# Patient Record
Sex: FEMALE | Race: OTHER | HISPANIC OR LATINO | URBAN - METROPOLITAN AREA
[De-identification: names, ages, dates, MRNs, and addresses within clinical notes are randomized per-mention and may not be internally consistent; named-entity substitution may affect disease eponyms.]

---
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
## 2023-01-20 NOTE — ASU PREOP CHECKLIST, PEDIATRIC - HEART RATE (BEATS/MIN)
[No Acute Distress] : in no acute distress [Well developed] : well developed [Well Nourished] : ~L well nourished [Good Hygeine] : demonstrates good hygeine [Oriented x3] : oriented to person, place, and time [Normal Memory] : ~T memory was ~L unimpaired [Normal Mood/Affect] : mood and affect are normal [No Edema] : ~T edema was not present [Normal Gait] : gait was normal [Normal] : was normal [Discharge] : a  ~M vaginal discharge was present [Moderate] : moderate [White] : white [Thin] : thin [No Bleeding] : there was no active vaginal bleeding [Tenderness] : ~T no ~M abdominal tenderness observed [Distended] : not distended [Foul Smelling] : not foul smelling [de-identified] : q-tip touch test 5/10 @ 1,11 and 8/10 @ 5,6,7. +erythema at posterior fourchette. No fissures, erosions, ulcerations. [de-identified] : Derm changes c/w vLP. +Sukhwinder's striae, numerous erythematous, patchy areas medial to Ramos's line and anterior vagina. [FreeTextEntry4] : Derm changes c/w vLP; PFMs 4/4 with MTrPs b/l levators, all groups and transverse perineii; +taut bands, +pain to palpatoin 95

## 2023-09-27 ENCOUNTER — APPOINTMENT (OUTPATIENT)
Dept: OTOLARYNGOLOGY | Facility: CLINIC | Age: 18
End: 2023-09-27
Payer: MEDICAID

## 2023-09-27 VITALS — BODY MASS INDEX: 16.13 KG/M2 | TEMPERATURE: 207.68 F | HEIGHT: 59 IN | WEIGHT: 80 LBS

## 2023-09-27 PROCEDURE — 69210 REMOVE IMPACTED EAR WAX UNI: CPT | Mod: LT

## 2023-09-27 PROCEDURE — 99214 OFFICE O/P EST MOD 30 MIN: CPT | Mod: 25

## 2023-09-27 RX ORDER — OFLOXACIN OTIC 3 MG/ML
0.3 SOLUTION AURICULAR (OTIC)
Qty: 1 | Refills: 5 | Status: ACTIVE | COMMUNITY
Start: 2023-09-27 | End: 1900-01-01

## 2023-10-03 ENCOUNTER — APPOINTMENT (OUTPATIENT)
Dept: PEDIATRIC ORTHOPEDIC SURGERY | Facility: CLINIC | Age: 18
End: 2023-10-03
Payer: MEDICAID

## 2023-10-03 DIAGNOSIS — M41.40 NEUROMUSCULAR SCOLIOSIS, SITE UNSPECIFIED: ICD-10-CM

## 2023-10-03 DIAGNOSIS — Q66.6 OTHER CONGENITAL VALGUS DEFORMITIES OF FEET: ICD-10-CM

## 2023-10-03 DIAGNOSIS — Z47.89 ENCOUNTER FOR OTHER ORTHOPEDIC AFTERCARE: ICD-10-CM

## 2023-10-03 DIAGNOSIS — M21.70 UNEQUAL LIMB LENGTH (ACQUIRED), UNSPECIFIED SITE: ICD-10-CM

## 2023-10-03 PROCEDURE — 99213 OFFICE O/P EST LOW 20 MIN: CPT | Mod: 25

## 2023-10-03 PROCEDURE — 72082 X-RAY EXAM ENTIRE SPI 2/3 VW: CPT

## 2023-10-06 ENCOUNTER — APPOINTMENT (OUTPATIENT)
Dept: OTOLARYNGOLOGY | Facility: CLINIC | Age: 18
End: 2023-10-06
Payer: MEDICAID

## 2023-10-06 VITALS — BODY MASS INDEX: 16.13 KG/M2 | TEMPERATURE: 208.4 F | WEIGHT: 80 LBS | HEIGHT: 59 IN

## 2023-10-06 DIAGNOSIS — H61.22 IMPACTED CERUMEN, LEFT EAR: ICD-10-CM

## 2023-10-06 DIAGNOSIS — H92.09 OTALGIA, UNSPECIFIED EAR: ICD-10-CM

## 2023-10-06 DIAGNOSIS — F79 UNSPECIFIED INTELLECTUAL DISABILITIES: ICD-10-CM

## 2023-10-06 PROCEDURE — 69210 REMOVE IMPACTED EAR WAX UNI: CPT | Mod: LT

## 2023-10-06 PROCEDURE — 99213 OFFICE O/P EST LOW 20 MIN: CPT | Mod: 25

## 2023-10-13 ENCOUNTER — APPOINTMENT (OUTPATIENT)
Dept: OTOLARYNGOLOGY | Facility: CLINIC | Age: 18
End: 2023-10-13
Payer: MEDICAID

## 2023-10-13 VITALS — TEMPERATURE: 208.4 F | BODY MASS INDEX: 16.13 KG/M2 | HEIGHT: 59 IN | WEIGHT: 80 LBS

## 2023-10-13 DIAGNOSIS — H65.92 UNSPECIFIED NONSUPPURATIVE OTITIS MEDIA, LEFT EAR: ICD-10-CM

## 2023-10-13 DIAGNOSIS — H61.21 IMPACTED CERUMEN, RIGHT EAR: ICD-10-CM

## 2023-10-13 PROCEDURE — 99213 OFFICE O/P EST LOW 20 MIN: CPT | Mod: 25

## 2023-10-13 PROCEDURE — 69210 REMOVE IMPACTED EAR WAX UNI: CPT | Mod: RT

## 2023-10-24 ENCOUNTER — APPOINTMENT (OUTPATIENT)
Age: 18
End: 2023-10-24
Payer: COMMERCIAL

## 2023-10-24 PROCEDURE — D0140: CPT

## 2023-10-31 ENCOUNTER — APPOINTMENT (OUTPATIENT)
Age: 18
End: 2023-10-31

## 2023-11-07 ENCOUNTER — APPOINTMENT (OUTPATIENT)
Dept: OTOLARYNGOLOGY | Facility: CLINIC | Age: 18
End: 2023-11-07
Payer: MEDICAID

## 2023-11-07 VITALS — WEIGHT: 80 LBS | HEIGHT: 59 IN | BODY MASS INDEX: 16.13 KG/M2

## 2023-11-07 DIAGNOSIS — H90.0 CONDUCTIVE HEARING LOSS, BILATERAL: ICD-10-CM

## 2023-11-07 DIAGNOSIS — Z01.10 ENCOUNTER FOR EXAMINATION OF EARS AND HEARING W/OUT ABNORMAL FINDINGS: ICD-10-CM

## 2023-11-07 DIAGNOSIS — H69.93 UNSPECIFIED EUSTACHIAN TUBE DISORDER, BILATERAL: ICD-10-CM

## 2023-11-07 PROCEDURE — 92579 VISUAL AUDIOMETRY (VRA): CPT

## 2023-11-07 PROCEDURE — 99213 OFFICE O/P EST LOW 20 MIN: CPT | Mod: 25

## 2023-11-07 PROCEDURE — 92567 TYMPANOMETRY: CPT

## 2023-11-16 NOTE — ED PEDIATRIC TRIAGE NOTE - SOURCE OF INFORMATION
Patient ID: Keven Reyes Cristino Reyes is a 10 year old male with a history of anxiety, ADHD, and mood disorder on Abilify being referred for neutropenia. History is provided by the patient and his mother.     On chart review, screening labs on 9/1/23 showed a WBC count of 2.7 with , Hgb 12.9, . Previous CBCs from 2021 and 2023 showed mild leukopenia (WBC 4.2), but no differentials so unclear if he was also neutropenic. A CBC from 2019 showed neutropenia with ANC of 960. He did have a normal ANC back in 2018.    Mom reports that Keven has been on Abilify for mood disorder for the last few years. She does not think it has been more than 5 years, but cannot remember exactly when the medication was started. She reports that Keven has screening lab work regularly to monitor for side effects from te Abilify. She reports he has gained a significant amount of weight since starting the medication, but otherwise has not had any noticeable side effects. However, Mom reports there has been neutropenia on CBCs, which initially was attributed to him being sick when labs were drawn. With most recent labs showing severe neutropenia, Keven's Psychiatrist and PCP both became concerned, prompting the referral.    Mom reports Keven was not sick at the time of the most recent lab draw. He has a chronic runny nose and congestion due to seasonal allergies, but no recent illnesses. He does not have a history of fevers occurring every few weeks, recurrent pharyngitis or oral ulcers, or bacterial infections including pneumonias, skin infections, or AOM. There is no known family history of neutropenia, cyclic fevers, autoimmune conditions, or immunodeficiencies.    Medications: Abilify, Adderall,  "Aggressive behavior, COVID-19 (02/26/2021), Developmental delay, Mood disorder (CMS/HCC), Nocturnal enuresis (08/15/2019), PTSD (post-traumatic stress disorder), and Seizures (CMS/HCC).    Surgical History:  Keven has no past surgical history on file.    Social History:  Keven lives with adoptive mothers and half sister (same Mom, 2/12 kids). Mothers have had him since 15 months of age. Prior to adoption, suffered significant neglect. Is in 7th grade and doing well, likes Language Arts. Plays baseball for school. Has cats at home.    Family History   Problem Relation Name Age of Onset    Alcohol abuse Mother      Drug abuse Mother      Bipolar disorder Mother      Alcohol abuse Father      Drug abuse Father      Schizophrenia Father      Neutropenia Neg Hx      Autoimmune disease Neg Hx      Immunodeficiency Neg Hx       Review of Systems   Constitutional:  Negative for activity change, appetite change and fever.   HENT:  Positive for congestion and rhinorrhea.    Eyes:  Negative for discharge and redness.   Respiratory:  Negative for cough and wheezing.    Gastrointestinal:  Negative for abdominal pain, nausea and vomiting.   Genitourinary:  Negative for decreased urine volume and difficulty urinating.   Musculoskeletal:  Negative for gait problem and joint swelling.   Skin:  Negative for rash and wound.   Allergic/Immunologic: Negative for environmental allergies and food allergies.   Hematological:  Negative for adenopathy. Does not bruise/bleed easily.     OBJECTIVE:    VS:  BP (!) 135/79 (BP Location: Right arm, Patient Position: Sitting, BP Cuff Size: Large adult)   Pulse 89   Temp 36.4 °C (97.5 °F) (Oral)   Resp 18   Ht 1.714 m (5' 7.48\")   Wt (!) 94.5 kg "   BMI 32.17 kg/m²   BSA: 2.12 meters squared    Physical Exam  Constitutional:       General: He is active. He is not in acute distress.     Appearance: He is obese. He is not toxic-appearing.      Comments: Answers questions appropriately for age   HENT:      Head: Normocephalic and atraumatic.      Nose: Rhinorrhea present. No congestion.      Comments: Mild clear nasal discharge     Mouth/Throat:      Mouth: Mucous membranes are moist.      Pharynx: Oropharynx is clear. No oropharyngeal exudate or posterior oropharyngeal erythema.      Comments: No oral lesions  Eyes:      General:         Right eye: No discharge.         Left eye: No discharge.      Conjunctiva/sclera: Conjunctivae normal.   Cardiovascular:      Rate and Rhythm: Normal rate and regular rhythm.      Pulses: Normal pulses.      Heart sounds: Normal heart sounds. No murmur heard.  Pulmonary:      Effort: Pulmonary effort is normal. No respiratory distress.      Breath sounds: Normal breath sounds. No wheezing, rhonchi or rales.   Abdominal:      General: Bowel sounds are normal. There is no distension.      Palpations: Abdomen is soft.      Tenderness: There is no abdominal tenderness. There is no guarding or rebound.   Musculoskeletal:         General: No swelling.   Lymphadenopathy:      Cervical: No cervical adenopathy.   Skin:     General: Skin is warm and dry.      Capillary Refill: Capillary refill takes less than 2 seconds.      Findings: No petechiae or rash.   Neurological:      General: No focal deficit present.      Mental Status: He is alert.      Gait: Gait normal.       Laboratory:  Results for orders placed or performed during the hospital encounter of 11/22/23 (from the past 24 hour(s))   CBC and Auto Differential   Result Value Ref Range    WBC 4.7 4.5 - 13.5 x10*3/uL    nRBC 0.0 0.0 - 0.0 /100 WBCs    RBC 5.25 4.50 - 5.30 x10*6/uL    Hemoglobin 13.7 13.0 - 16.0 g/dL    Hematocrit 43.3 37.0 - 49.0 %    MCV 83 78 - 102 fL    MCH  26.1 26.0 - 34.0 pg    MCHC 31.6 31.0 - 37.0 g/dL    RDW 12.9 11.5 - 14.5 %    Platelets 364 150 - 400 x10*3/uL    Neutrophils % 33.3 33.0 - 69.0 %    Immature Granulocytes %, Automated 0.2 0.0 - 1.0 %    Lymphocytes % 51.8 28.0 - 48.0 %    Monocytes % 8.9 3.0 - 9.0 %    Eosinophils % 4.5 0.0 - 5.0 %    Basophils % 1.3 0.0 - 1.0 %    Neutrophils Absolute 1.57 1.20 - 7.70 x10*3/uL    Immature Granulocytes Absolute, Automated 0.01 0.00 - 0.10 x10*3/uL    Lymphocytes Absolute 2.44 1.80 - 4.80 x10*3/uL    Monocytes Absolute 0.42 0.10 - 1.00 x10*3/uL    Eosinophils Absolute 0.21 0.00 - 0.70 x10*3/uL    Basophils Absolute 0.06 0.00 - 0.10 x10*3/uL   Red Top   Result Value Ref Range    Extra Tube Hold for add-ons.         ASSESSMENT and PLAN:    Cristino Reyes is a 10 year old male with a history of anxiety, ADHD, and aggression on Abilify being referred for neutropenia with an ANC on last CBC of 420. ANC improved to normal today. No history of severe or intercurrent infections, making immunodeficiency or cyclic neutropenia less likely. Autoimmune neutropenia less likely given age. No recent illnesses to explain neutropenia. Neutropenia has been reported as a less common side effect of Abilify in addition to agranulocytosis. Currently, medication side effect seems like the most likely etiology. If testing comes back negative and/or neutropenia does not improve with discontinuation of medication, would need further work up for autoimmune causes or cyclic neutropenia.    Neutropenia:  -Labs:   -CBC   -Drug-induced neutrophil antibody testing sent to [facility]  -Education: Discussed that severe neutropenia puts Keven at risk for severe bacterial infections. If ANC persists below 500, fevers would be an emergency. Parent advised to call the office in the event of any fevers or illnesses and discussed that they may be directed to the emergency room for labs, cultures, and antibiotics  -Recommended asking Psychiatrist at next  Mom by phone    Jewels Alvarez DO  Pediatric Hematology/Oncology Fellow ([id number])    Mother/EMS

## 2024-01-02 ENCOUNTER — APPOINTMENT (OUTPATIENT)
Age: 19
End: 2024-01-02
Payer: COMMERCIAL

## 2024-01-02 PROCEDURE — D0120: CPT

## 2024-01-02 PROCEDURE — D1110 PROPHYLAXIS - ADULT: CPT

## 2024-01-17 NOTE — DISCHARGE NOTE NURSING/CASE MANAGEMENT/SOCIAL WORK - NSFLUVACAGEDISCH_IMM_ALL_CORE
HOME HEALTH NURSE CALL      Name of the Nurse Calling and Facility: Trinidad Atkinson      Best Contact Number to Reach the Nurse: [phone]          Reason For Call:  [facility] follow up patient has not been seen since 2019 if not longer, he is scheduled as a new patient with Ana Cristina next Thursday at 8:45am for hospital follow up. First visit will be focused on hospital follow up, and we will schedule a follow up for the estab of care.    She wants to go over his OT and PT plan of care so she does want to speak with a nurse.    Pediatric

## 2024-02-29 ENCOUNTER — APPOINTMENT (OUTPATIENT)
Dept: OTOLARYNGOLOGY | Facility: CLINIC | Age: 19
End: 2024-02-29

## 2024-06-25 ENCOUNTER — APPOINTMENT (OUTPATIENT)
Dept: OTOLARYNGOLOGY | Facility: CLINIC | Age: 19
End: 2024-06-25
Payer: MEDICAID

## 2024-06-25 VITALS
BODY MASS INDEX: 16.33 KG/M2 | DIASTOLIC BLOOD PRESSURE: 82 MMHG | WEIGHT: 81 LBS | HEIGHT: 59 IN | HEART RATE: 114 BPM | TEMPERATURE: 98.2 F | SYSTOLIC BLOOD PRESSURE: 129 MMHG

## 2024-06-25 DIAGNOSIS — H92.11 OTORRHEA, RIGHT EAR: ICD-10-CM

## 2024-06-25 DIAGNOSIS — H61.21 IMPACTED CERUMEN, RIGHT EAR: ICD-10-CM

## 2024-06-25 DIAGNOSIS — H72.92 UNSPECIFIED PERFORATION OF TYMPANIC MEMBRANE, LEFT EAR: ICD-10-CM

## 2024-06-25 DIAGNOSIS — G80.9 CEREBRAL PALSY, UNSPECIFIED: ICD-10-CM

## 2024-06-25 PROCEDURE — 69210 REMOVE IMPACTED EAR WAX UNI: CPT

## 2024-06-25 PROCEDURE — 99213 OFFICE O/P EST LOW 20 MIN: CPT | Mod: 25

## 2024-06-25 RX ORDER — OFLOXACIN OTIC 3 MG/ML
0.3 SOLUTION AURICULAR (OTIC) TWICE DAILY
Qty: 1 | Refills: 0 | Status: ACTIVE | COMMUNITY
Start: 2024-06-25 | End: 1900-01-01

## 2024-07-09 ENCOUNTER — APPOINTMENT (OUTPATIENT)
Age: 19
End: 2024-07-09
Payer: COMMERCIAL

## 2024-07-09 PROCEDURE — D0120: CPT

## 2024-07-09 PROCEDURE — D1110 PROPHYLAXIS - ADULT: CPT

## 2024-08-13 ENCOUNTER — APPOINTMENT (OUTPATIENT)
Dept: PEDIATRIC ORTHOPEDIC SURGERY | Facility: CLINIC | Age: 19
End: 2024-08-13

## 2024-08-30 ENCOUNTER — APPOINTMENT (OUTPATIENT)
Dept: OTOLARYNGOLOGY | Facility: CLINIC | Age: 19
End: 2024-08-30
Payer: MEDICAID

## 2024-08-30 DIAGNOSIS — Z01.10 ENCOUNTER FOR EXAMINATION OF EARS AND HEARING W/OUT ABNORMAL FINDINGS: ICD-10-CM

## 2024-08-30 DIAGNOSIS — H61.21 IMPACTED CERUMEN, RIGHT EAR: ICD-10-CM

## 2024-08-30 PROCEDURE — 99213 OFFICE O/P EST LOW 20 MIN: CPT | Mod: 25

## 2024-08-30 PROCEDURE — 69210 REMOVE IMPACTED EAR WAX UNI: CPT | Mod: RT

## 2024-08-30 RX ORDER — OFLOXACIN OTIC 3 MG/ML
0.3 SOLUTION AURICULAR (OTIC)
Qty: 1 | Refills: 2 | Status: ACTIVE | COMMUNITY
Start: 2024-08-30 | End: 1900-01-01

## 2024-09-03 ENCOUNTER — APPOINTMENT (OUTPATIENT)
Dept: PEDIATRIC ORTHOPEDIC SURGERY | Facility: CLINIC | Age: 19
End: 2024-09-03
Payer: MEDICAID

## 2024-09-03 DIAGNOSIS — M41.40 NEUROMUSCULAR SCOLIOSIS, SITE UNSPECIFIED: ICD-10-CM

## 2024-09-03 DIAGNOSIS — M79.89 OTHER SPECIFIED SOFT TISSUE DISORDERS: ICD-10-CM

## 2024-09-03 DIAGNOSIS — G80.9 CEREBRAL PALSY, UNSPECIFIED: ICD-10-CM

## 2024-09-03 DIAGNOSIS — R22.1 LOCALIZED SWELLING, MASS AND LUMP, NECK: ICD-10-CM

## 2024-09-03 PROCEDURE — 99213 OFFICE O/P EST LOW 20 MIN: CPT | Mod: 25

## 2024-09-03 PROCEDURE — 72082 X-RAY EXAM ENTIRE SPI 2/3 VW: CPT

## 2024-09-03 NOTE — ASSESSMENT
[FreeTextEntry1] : Left TM perforation: -Clean and dry  -wax removed  f/u 9/2024 for audio and poss HAE

## 2024-09-03 NOTE — HISTORY OF PRESENT ILLNESS
[de-identified] : 19 yo Patient here with mom for follow up on Left TM perforation. Mom stats she has been doing well since last visit. No ear pain or drainage as per mom.  [FreeTextEntry1] : 8/30/2024 Right sided HL h/o TM perf

## 2024-09-03 NOTE — HISTORY OF PRESENT ILLNESS
[de-identified] : 19 yo Patient here with mom for follow up on Left TM perforation. Mom stats she has been doing well since last visit. No ear pain or drainage as per mom.  [FreeTextEntry1] : 8/30/2024 Right sided HL h/o TM perf

## 2024-09-03 NOTE — DATA REVIEWED
[de-identified] : Hearing Test performed to evaluate the extent of hearing loss and  to explain pt's symptoms today's hearing test was personally reviewed and revealed Tymps-abn poor reilability

## 2024-09-03 NOTE — DATA REVIEWED
[de-identified] : Hearing Test performed to evaluate the extent of hearing loss and  to explain pt's symptoms today's hearing test was personally reviewed and revealed Tymps-abn poor reilability

## 2024-09-04 ENCOUNTER — APPOINTMENT (OUTPATIENT)
Dept: OTOLARYNGOLOGY | Facility: CLINIC | Age: 19
End: 2024-09-04

## 2024-09-04 PROBLEM — R22.1 NECK MASS: Status: ACTIVE | Noted: 2024-09-04

## 2024-09-04 NOTE — REVIEW OF SYSTEMS
[No Acute Changes] : No acute changes since previous visit [Change in Activity] : no change in activity [Tachypnea] : no tachypnea [Congestion] : no congestion [Vomiting] : no vomiting [Joint Pains] : no arthralgias

## 2024-09-04 NOTE — DATA REVIEWED
[de-identified] : My review and interpretation of the radiologic studies: Scoliosis radiographs were obtained and then independently reviewed today in clinic: S/p PSF, hardware in place. limited lateral evaluation.

## 2024-09-04 NOTE — PHYSICAL EXAM
[FreeTextEntry1] : GENERAL: alert in NAD SKIN: The skin is intact, warm, pink and dry over the area examined. CARDIOVASCULAR: brisk capillary refill, but no peripheral edema. RESPIRATORY: The patient is in no apparent respiratory distress. They're taking full deep breaths without use of accessory muscles or evidence of audible wheezes or stridor without the use of a stethoscope. Normal respiratory effort. ABDOMEN: not examined.  AIT: She is able to ambulate with stiffness in the spine.  SPINE: well healed midline incision. Good overall alignment. There is a palpable soft mass on the posterior aspect of her neck.  BLE: bilateral genu valgum LLD noted right 2 cm shorter than left.  ankle /foot: healed small abrasion noted on second toe and anterior ankle.  DF past neutral. bilateral planovalgus noted.

## 2024-09-04 NOTE — ASSESSMENT
[FreeTextEntry1] : 18-year-old female with CP, Neuromuscular scoliosis, Pes planovalgus, and LLD.   Due to the patient's developmental and communication issues, the history today was obtained by the parent as an independent historian. Clinically, she is doing well and has no apparent discomfort. She continues to be an independent ambulator.  It was discussed with the orthotist today about obtaining a right sided external shoe lift to help address her leg length discrepancy as she was unable to tolerate her previous orthotics; A prescription was provided today. She should continue with PT and OT. No orthopedic restrictions. Regarding the mass on her neck, I am concerned about a possible lipoma. I would like to obtain an MRI of the patient's cervical spine with sedation to confirm. Our  will contact family with MRI authorization. Follow-up will occur once the patient and their family obtain MRI results.   All questions and concerns were addressed today. Parent and patient verbalize understanding and agree with plan of care.  Documented by Sarah Mir acting as a scribe for Dr. Brooks on 09/03/2024.   The above documentation completed by the scribe is an accurate record of both my words and actions.

## 2024-09-04 NOTE — HISTORY OF PRESENT ILLNESS
[FreeTextEntry1] : 19 yo female with history of CP and autism who is a Jehovah's witness s/p staged PSF the last surgery being 8/20. She is also noted to have a leg length discrepancy which has been treated non operatively. She was lost to follow up in September 2021. We last saw her again in October 2023 where care was reestablished. Please see prior clinic notes for additional information.   Today, her mother states that Leticia is overall doing well. She had received custom orthotics for her LLD, but was unable to tolerate them, and mother is interested in obtaining an external shoe lift instead. She gets PT and OT 2 times per week. There is no concern for her back. Mother reports new concern of a bump on the back of the patient's neck. It does not appear to cause her any apparent pain, but it does cause her to tilt her head downward. She is an independent ambulator. She presents today for continued management of the same.

## 2024-09-04 NOTE — DATA REVIEWED
[de-identified] : My review and interpretation of the radiologic studies: Scoliosis radiographs were obtained and then independently reviewed today in clinic: S/p PSF, hardware in place. limited lateral evaluation.

## 2024-09-04 NOTE — HISTORY OF PRESENT ILLNESS
[FreeTextEntry1] : 17 yo female with history of CP and autism who is a Jain s/p staged PSF the last surgery being 8/20. She is also noted to have a leg length discrepancy which has been treated non operatively. She was lost to follow up in September 2021. We last saw her again in October 2023 where care was reestablished. Please see prior clinic notes for additional information.   Today, her mother states that Leticia is overall doing well. She had received custom orthotics for her LLD, but was unable to tolerate them, and mother is interested in obtaining an external shoe lift instead. She gets PT and OT 2 times per week. There is no concern for her back. Mother reports new concern of a bump on the back of the patient's neck. It does not appear to cause her any apparent pain, but it does cause her to tilt her head downward. She is an independent ambulator. She presents today for continued management of the same.

## 2024-09-09 ENCOUNTER — APPOINTMENT (OUTPATIENT)
Dept: OTOLARYNGOLOGY | Facility: CLINIC | Age: 19
End: 2024-09-09

## 2024-09-12 NOTE — ASU PATIENT PROFILE, PEDIATRIC - FALLS ASSESSMENT TOOL TOTAL
Medication Timeout    Date patient scheduled: 9/12/24    Avastin held for this cycle due to proteinuria.     Office RN notified patient?? Yes.     Is the patient scheduled within 24 hours?? Yes, BETTIE Rosas at [facility] made aware.     Office RN to route to INF  pool. Infusion  pool routes to INF TECH POOL.    Infusion tech to receive message, confirm scheduled treatment duration matches ordered treatment duration or adjust accordingly, and re-link appointment request orders.    Infusion tech to notify pharmacy and finance.    10

## 2024-09-23 ENCOUNTER — APPOINTMENT (OUTPATIENT)
Dept: OTOLARYNGOLOGY | Facility: CLINIC | Age: 19
End: 2024-09-23
Payer: MEDICAID

## 2024-09-23 VITALS — HEIGHT: 59 IN

## 2024-09-23 DIAGNOSIS — F79 UNSPECIFIED INTELLECTUAL DISABILITIES: ICD-10-CM

## 2024-09-23 DIAGNOSIS — H72.01 CENTRAL PERFORATION OF TYMPANIC MEMBRANE, RIGHT EAR: ICD-10-CM

## 2024-09-23 DIAGNOSIS — Z01.10 ENCOUNTER FOR EXAMINATION OF EARS AND HEARING W/OUT ABNORMAL FINDINGS: ICD-10-CM

## 2024-09-23 DIAGNOSIS — H72.02 CENTRAL PERFORATION OF TYMPANIC MEMBRANE, LEFT EAR: ICD-10-CM

## 2024-09-23 DIAGNOSIS — H69.93 UNSPECIFIED EUSTACHIAN TUBE DISORDER, BILATERAL: ICD-10-CM

## 2024-09-23 PROCEDURE — 92504 EAR MICROSCOPY EXAMINATION: CPT

## 2024-09-23 PROCEDURE — 92567 TYMPANOMETRY: CPT

## 2024-09-23 PROCEDURE — 92579 VISUAL AUDIOMETRY (VRA): CPT

## 2024-09-23 PROCEDURE — 99213 OFFICE O/P EST LOW 20 MIN: CPT | Mod: 25,57

## 2024-09-23 NOTE — DATA REVIEWED
[de-identified] : Hearing Test performed to evaluate the extent of hearing loss and  to explain pt's symptoms  was personally reviewed and revealed Tymps-abn Hearing-poor reliabilty

## 2024-09-23 NOTE — PHYSICAL EXAM
[de-identified] : L and Right  TM perf dry and clear  [Midline] : trachea located in midline position [Normal] : no rashes

## 2024-09-23 NOTE — HISTORY OF PRESENT ILLNESS
[de-identified] : Ms. MENA is a 18 year non verbal female known L TM perf last seen 11/2023, unable to perform audio and ABR was offered and declined. today pt here with mom who states she has drainage from R ear since Sunday 6/23/24 mom has been giving her Tylenol and feel she is in pain no infections since 11/2023 [FreeTextEntry1] : 9/23/2024 f/u eval no ear drainaghe but Mo notes that the pt has a hearing loss

## 2024-09-23 NOTE — ASSESSMENT
[FreeTextEntry1] : Bilat TM perf's w/ suspected hearing loss and intermittent infections Rec  consider tympanoplasty Refer to Otology

## 2024-10-25 ENCOUNTER — TRANSCRIPTION ENCOUNTER (OUTPATIENT)
Age: 19
End: 2024-10-25

## 2024-10-25 ENCOUNTER — OUTPATIENT (OUTPATIENT)
Dept: OUTPATIENT SERVICES | Age: 19
LOS: 1 days | End: 2024-10-25

## 2024-10-25 ENCOUNTER — APPOINTMENT (OUTPATIENT)
Dept: MRI IMAGING | Facility: HOSPITAL | Age: 19
End: 2024-10-25

## 2024-10-25 VITALS
TEMPERATURE: 98 F | WEIGHT: 81.13 LBS | SYSTOLIC BLOOD PRESSURE: 120 MMHG | RESPIRATION RATE: 18 BRPM | HEIGHT: 58.23 IN | DIASTOLIC BLOOD PRESSURE: 98 MMHG | OXYGEN SATURATION: 97 % | HEART RATE: 112 BPM

## 2024-10-25 VITALS
OXYGEN SATURATION: 97 % | SYSTOLIC BLOOD PRESSURE: 93 MMHG | DIASTOLIC BLOOD PRESSURE: 74 MMHG | HEART RATE: 100 BPM | RESPIRATION RATE: 18 BRPM

## 2024-10-25 DIAGNOSIS — Z92.89 PERSONAL HISTORY OF OTHER MEDICAL TREATMENT: Chronic | ICD-10-CM

## 2024-10-25 DIAGNOSIS — G80.9 CEREBRAL PALSY, UNSPECIFIED: ICD-10-CM

## 2024-10-25 DIAGNOSIS — H04.553 ACQUIRED STENOSIS OF BILATERAL NASOLACRIMAL DUCT: Chronic | ICD-10-CM

## 2024-10-25 DIAGNOSIS — Z98.1 ARTHRODESIS STATUS: Chronic | ICD-10-CM

## 2024-10-25 PROCEDURE — 72141 MRI NECK SPINE W/O DYE: CPT | Mod: 26

## 2024-10-25 NOTE — ASU DISCHARGE PLAN (ADULT/PEDIATRIC) - ACTIVITY LEVEL
No sports/gym/Quiet play Information: Selecting Yes will display possible errors in your note based on the variables you have selected. This validation is only offered as a suggestion for you. PLEASE NOTE THAT THE VALIDATION TEXT WILL BE REMOVED WHEN YOU FINALIZE YOUR NOTE. IF YOU WANT TO FAX A PRELIMINARY NOTE YOU WILL NEED TO TOGGLE THIS TO 'NO' IF YOU DO NOT WANT IT IN YOUR FAXED NOTE.

## 2024-10-25 NOTE — ASU DISCHARGE PLAN (ADULT/PEDIATRIC) - FINANCIAL ASSISTANCE
[facility] provides services at a reduced cost to those who are determined to be eligible through [facility]’s financial assistance program. Information regarding [facility]’s financial assistance program can be found by going to [email or web address] or by calling [phone].

## 2024-10-25 NOTE — ASU PATIENT PROFILE, ADULT - NSICDXPASTSURGICALHX_GEN_ALL_CORE_FT
PAST SURGICAL HISTORY:  History of MRI of brain at 3-3yrs of age. no complications.  [place] [facility] [place]    History of spinal fusion for scoliosis stage 1, 7/23/20. Dr. Brooks.    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

## 2024-10-25 NOTE — ASU PATIENT PROFILE, ADULT - FALL HARM RISK - HARM RISK INTERVENTIONS

## 2024-10-25 NOTE — ASU PATIENT PROFILE, ADULT - NSICDXPASTMEDICALHX_GEN_ALL_CORE_FT
PAST MEDICAL HISTORY:  Global Developmental Delay     Language barrier     Neuromuscular scoliosis     Patient is Confucianism

## 2024-10-25 NOTE — ASU DISCHARGE PLAN (ADULT/PEDIATRIC) - CARE PROVIDER_API CALL
Irineo Brooks)  Orthopaedic Surgery  [address] [place] [address]  Phone: [phone]  Fax: [phone]  Follow Up Time:

## 2024-10-28 ENCOUNTER — APPOINTMENT (OUTPATIENT)
Dept: OTOLARYNGOLOGY | Facility: CLINIC | Age: 19
End: 2024-10-28
Payer: MEDICAID

## 2024-10-28 DIAGNOSIS — H72.93 UNSPECIFIED PERFORATION OF TYMPANIC MEMBRANE, BILATERAL: ICD-10-CM

## 2024-10-28 PROCEDURE — 99214 OFFICE O/P EST MOD 30 MIN: CPT

## 2024-10-28 RX ORDER — LORATADINE 5 MG
TABLET,CHEWABLE ORAL
Refills: 0 | Status: ACTIVE | COMMUNITY

## 2024-11-05 ENCOUNTER — APPOINTMENT (OUTPATIENT)
Age: 19
End: 2024-11-05

## 2024-11-06 ENCOUNTER — APPOINTMENT (OUTPATIENT)
Dept: PEDIATRIC ORTHOPEDIC SURGERY | Facility: CLINIC | Age: 19
End: 2024-11-06
Payer: MEDICAID

## 2024-11-06 DIAGNOSIS — G80.9 CEREBRAL PALSY, UNSPECIFIED: ICD-10-CM

## 2024-11-06 DIAGNOSIS — M41.40 NEUROMUSCULAR SCOLIOSIS, SITE UNSPECIFIED: ICD-10-CM

## 2024-11-06 DIAGNOSIS — R22.1 LOCALIZED SWELLING, MASS AND LUMP, NECK: ICD-10-CM

## 2024-11-06 DIAGNOSIS — M21.70 UNEQUAL LIMB LENGTH (ACQUIRED), UNSPECIFIED SITE: ICD-10-CM

## 2024-11-06 DIAGNOSIS — Q66.6 OTHER CONGENITAL VALGUS DEFORMITIES OF FEET: ICD-10-CM

## 2024-11-06 PROCEDURE — 99214 OFFICE O/P EST MOD 30 MIN: CPT

## 2025-01-17 ENCOUNTER — RESULT REVIEW (OUTPATIENT)
Age: 20
End: 2025-01-17

## 2025-01-22 ENCOUNTER — OUTPATIENT (OUTPATIENT)
Dept: OUTPATIENT SERVICES | Facility: HOSPITAL | Age: 20
LOS: 1 days | End: 2025-01-22
Payer: MEDICAID

## 2025-01-22 ENCOUNTER — APPOINTMENT (OUTPATIENT)
Dept: CT IMAGING | Facility: CLINIC | Age: 20
End: 2025-01-22
Payer: MEDICAID

## 2025-01-22 DIAGNOSIS — Z92.89 PERSONAL HISTORY OF OTHER MEDICAL TREATMENT: Chronic | ICD-10-CM

## 2025-01-22 DIAGNOSIS — G80.9 CEREBRAL PALSY, UNSPECIFIED: ICD-10-CM

## 2025-01-22 DIAGNOSIS — Z98.1 ARTHRODESIS STATUS: Chronic | ICD-10-CM

## 2025-01-22 PROCEDURE — 70480 CT ORBIT/EAR/FOSSA W/O DYE: CPT

## 2025-01-22 PROCEDURE — 70480 CT ORBIT/EAR/FOSSA W/O DYE: CPT | Mod: 26

## 2025-01-30 ENCOUNTER — NON-APPOINTMENT (OUTPATIENT)
Age: 20
End: 2025-01-30

## 2025-02-12 ENCOUNTER — APPOINTMENT (OUTPATIENT)
Dept: OTOLARYNGOLOGY | Facility: CLINIC | Age: 20
End: 2025-02-12

## 2025-02-12 VITALS — WEIGHT: 75 LBS | HEIGHT: 58 IN | BODY MASS INDEX: 15.74 KG/M2

## 2025-02-12 PROCEDURE — 99214 OFFICE O/P EST MOD 30 MIN: CPT

## 2025-04-07 ENCOUNTER — APPOINTMENT (OUTPATIENT)
Dept: OTOLARYNGOLOGY | Facility: CLINIC | Age: 20
End: 2025-04-07

## 2025-04-07 VITALS — BODY MASS INDEX: 15.74 KG/M2 | WEIGHT: 75 LBS | HEIGHT: 58 IN

## 2025-04-07 DIAGNOSIS — H90.0 CONDUCTIVE HEARING LOSS, BILATERAL: ICD-10-CM

## 2025-04-07 DIAGNOSIS — H72.93 UNSPECIFIED PERFORATION OF TYMPANIC MEMBRANE, BILATERAL: ICD-10-CM

## 2025-04-07 DIAGNOSIS — G80.9 CEREBRAL PALSY, UNSPECIFIED: ICD-10-CM

## 2025-04-07 PROCEDURE — 92579 VISUAL AUDIOMETRY (VRA): CPT

## 2025-04-07 PROCEDURE — 92567 TYMPANOMETRY: CPT

## 2025-04-07 PROCEDURE — 99213 OFFICE O/P EST LOW 20 MIN: CPT

## 2025-04-09 ENCOUNTER — OUTPATIENT (OUTPATIENT)
Dept: OUTPATIENT SERVICES | Facility: HOSPITAL | Age: 20
LOS: 1 days | End: 2025-04-09

## 2025-04-09 VITALS
OXYGEN SATURATION: 97 % | RESPIRATION RATE: 14 BRPM | WEIGHT: 82.01 LBS | HEART RATE: 116 BPM | SYSTOLIC BLOOD PRESSURE: 112 MMHG | HEIGHT: 59 IN | DIASTOLIC BLOOD PRESSURE: 77 MMHG | TEMPERATURE: 98 F

## 2025-04-09 DIAGNOSIS — H72.90 UNSPECIFIED PERFORATION OF TYMPANIC MEMBRANE, UNSPECIFIED EAR: ICD-10-CM

## 2025-04-09 DIAGNOSIS — H72.01 CENTRAL PERFORATION OF TYMPANIC MEMBRANE, RIGHT EAR: ICD-10-CM

## 2025-04-09 DIAGNOSIS — H72.02 CENTRAL PERFORATION OF TYMPANIC MEMBRANE, LEFT EAR: ICD-10-CM

## 2025-04-09 DIAGNOSIS — H04.553 ACQUIRED STENOSIS OF BILATERAL NASOLACRIMAL DUCT: Chronic | ICD-10-CM

## 2025-04-09 DIAGNOSIS — Z98.1 ARTHRODESIS STATUS: Chronic | ICD-10-CM

## 2025-04-09 DIAGNOSIS — Z92.89 PERSONAL HISTORY OF OTHER MEDICAL TREATMENT: Chronic | ICD-10-CM

## 2025-04-09 RX ORDER — POLYETHYLENE GLYCOL 3350 17 G/17G
17 POWDER, FOR SOLUTION ORAL
Refills: 0 | DISCHARGE

## 2025-04-09 NOTE — H&P PST ADULT - PROBLEM SELECTOR PLAN 1
Pt scheduled for right tympanoplasty left myringoplasty auditory brain stem evoked response on 4/15/2025.  Preop teaching done,  pt mother able to verbalize understanding.   medication day of procedure-  none.    mother Delia will accompany pt on the day of procedure

## 2025-04-09 NOTE — H&P PST ADULT - NEUROLOGICAL COMMENTS
global developmentally delayed , nonverbal, unable to follow commands cp, global developmentally delayed , nonverbal, unable to follow commands

## 2025-04-09 NOTE — H&P PST ADULT - NSICDXPASTSURGICALHX_GEN_ALL_CORE_FT
PAST SURGICAL HISTORY:  History of MRI of brain at 3-3yrs of age. no complications.  [facility]20[place] [facility] [place]    History of spinal fusion for scoliosis stage 1, 7/23/20. Dr. Brooks.    Obstruction of both lacrimal ducts 1yrs of age, [facility] PAST SURGICAL HISTORY:  History of MRI of brain at [age]yrs of age. no complications.  [facility] [place]    History of spinal fusion for scoliosis stage 1, 7/23/20. Dr. Brooks.    Obstruction of both lacrimal ducts 1yrs of age, [facility]    S/P spinal fusion

## 2025-04-09 NOTE — H&P PST ADULT - OTHER CARE PROVIDERS
Hematology: Ab Gamboa MD Neurology: Heidi Mcneal MD; Cardiologist: Cecile Agarwal DO Hematology: Ab Gamboa MD Neurology: Heidi Mcneal MD; Cardiologist: Cecile Agarwal,

## 2025-04-09 NOTE — H&P PST ADULT - MUSCULOSKELETAL COMMENTS
hx of scolosis hx of scolioses,  s/p T4-L4; fusion T4-T8 and T12-L4; multiple vertebral osteotomies 2020, as altered gait

## 2025-04-09 NOTE — H&P PST ADULT - ENMT COMMENTS
bilateral perforated tm does not follow commands does not follow commands, bilateral perforated tm's

## 2025-04-09 NOTE — H&P PST ADULT - NSICDXPASTMEDICALHX_GEN_ALL_CORE_FT
PAST MEDICAL HISTORY:  Global Developmental Delay     Language barrier     Neuromuscular scoliosis     Patient is Jainism PAST MEDICAL HISTORY:  Cerebral palsy     Global Developmental Delay     Neuromuscular scoliosis     Patient is Zoroastrianism     Perforated tympanic membrane

## 2025-04-09 NOTE — H&P PST ADULT - NSANTHOSAYNRD_GEN_A_CORE
No. MATEO screening performed.  STOP BANG Legend: 0-2 = LOW Risk; 3-4 = INTERMEDIATE Risk; 5-8 = HIGH Risk

## 2025-04-09 NOTE — H&P PST ADULT - HISTORY OF PRESENT ILLNESS
IMPRESSION:  alter mental status resolve most likely secondary to symptomatic bradycardia   CHF   doubt sepsis no sign  of PNA   COY on CKD     PLAN:    CNS: ne no sedation     HEENT: oral care     PULMONARY: keep pox > 92 %   now on NC bipap at night and as needed   CARDIOVASCULAR:   cardiology consult /EP   on lasix drip follow Is and OS     GI: GI prophylaxis.  speech and swallow eval if remain of bipap     RENAL: renal consult     INFECTIOUS DISEASE: follow cx continue abx for now   check RVP     HEMATOLOGICAL:  DVT prophylaxis.    ENDOCRINE:  Follow up FS.  Insulin protocol if needed.        CRITICAL CARE TIME SPENT: *** IMPRESSION:  alter mental status resolve most likely secondary to symptomatic bradycardia   CHF   doubt sepsis no sign  of PNA   COY on CKD     PLAN:    CNS: ne no sedation     HEENT: oral care     PULMONARY: keep pox > 92 %   now on NC bipap at night and as needed   CARDIOVASCULAR:   cardiology consult /EP   on lasix drip follow Is and OS     GI: GI prophylaxis.  speech and swallow eval if remain of bipap     RENAL: renal consult     INFECTIOUS DISEASE: follow cx continue abx for now   check RVP     HEMATOLOGICAL:  DVT prophylaxis. give vit K INR 10   of coumadin     ENDOCRINE:  Follow up FS.  Insulin protocol if needed.        CRITICAL CARE TIME SPENT: *** 20y/o female scheduled for right tympanoplasty left myringoplasty auditory brain stem evoked reponse on 4/15/2025.   Hx of chronic otitits media for more than 5 yrs, last 1 yr ago, bilateral tympanic perferoation         PST evaluation in preparation for T2-L4 completion of instrumentation, PSF vertebral osteotomies with AIRO on 8/31/20 with Dr. Brooks. normal (ped)... 18y/o female scheduled for right tympanoplasty left myringoplasty auditory brain stem evoked reponse on 4/15/2025.   Hx of  global developmental delay, cerebral palsy, neuromuscular scolioses,  s/p T2-L4 completion of instrumentation, PSF vertebral osteotomies 8/2020,  chronic otitis media for more than 5 yrs, last 1 yr ago, bilateral perforated tm.          \ 20y/o female scheduled for right tympanoplasty left myringoplasty auditory brain stem evoked response on 4/15/2025.   Hx of  global developmental delay, cerebral palsy, neuromuscular scolioses,  s/p T2-L4 completion of instrumentation, PSF vertebral osteotomies 8/2020,  chronic otitis media for more than 5 yrs, last 1 yr ago, bilateral perforated tm.

## 2025-04-09 NOTE — H&P PST ADULT - GASTROINTESTINAL
details… soft/nontender/nondistended/normal active bowel sounds/no guarding/no rigidity/no organomegaly/no palpable maxwell/no masses palpable

## 2025-04-10 PROBLEM — Z78.9 OTHER SPECIFIED HEALTH STATUS: Chronic | Status: INACTIVE | Noted: 2020-07-16 | Resolved: 2025-04-09

## 2025-04-14 VITALS
DIASTOLIC BLOOD PRESSURE: 72 MMHG | OXYGEN SATURATION: 99 % | TEMPERATURE: 98 F | WEIGHT: 81.57 LBS | HEIGHT: 59 IN | HEART RATE: 114 BPM | RESPIRATION RATE: 18 BRPM | SYSTOLIC BLOOD PRESSURE: 112 MMHG

## 2025-04-14 NOTE — ASU PREOPERATIVE ASSESSMENT, ADULT (IPARK ONLY) - NPO AFTER
SUBJECTIVE:   Charlee Turner is a 13 y.o. female presenting for well adolescent and school/sports physical. She is seen today accompanied by father. She is participating in lacrosse at [employer].    PMH: No asthma, diabetes, heart disease, epilepsy or orthopedic problems in the past.  ROS: no wheezing, cough or dyspnea, no chest pain, no abdominal pain, no headaches, no bowel or bladder symptoms, no breast pain or lumps, regular menstrual cycles, complains of acne on face  No problems during sports participation in the past.   Social History: Denies the use of tobacco, alcohol or street drugs.  Parental concerns: none    OBJECTIVE:   General appearance: WDWN female.  ENT: ears and throat normal  Eyes: Vision : Right: 20/20; Left: 20/20 without correction; PERRLA  Neck: supple, thyroid normal, no adenopathy  Lungs:  clear, no wheezing or rales  Heart: no murmur, regular rate and rhythm, normal S1 and S2  Abdomen: no masses palpated, no organomegaly or tenderness  Spine: normal, no scoliosis  Skin: Normal with none acne noted.  Neuro: normal  Extremities: normal    ASSESSMENT:   Well adolescent female    PLAN:   Cleared for school and sports activities.  
23:00

## 2025-04-15 ENCOUNTER — APPOINTMENT (OUTPATIENT)
Dept: SPEECH THERAPY | Facility: HOSPITAL | Age: 20
End: 2025-04-15

## 2025-04-15 ENCOUNTER — OUTPATIENT (OUTPATIENT)
Dept: OUTPATIENT SERVICES | Facility: HOSPITAL | Age: 20
LOS: 1 days | End: 2025-04-15
Payer: MEDICAID

## 2025-04-15 ENCOUNTER — APPOINTMENT (OUTPATIENT)
Dept: OTOLARYNGOLOGY | Facility: AMBULATORY SURGERY CENTER | Age: 20
End: 2025-04-15

## 2025-04-15 ENCOUNTER — TRANSCRIPTION ENCOUNTER (OUTPATIENT)
Age: 20
End: 2025-04-15

## 2025-04-15 ENCOUNTER — RESULT REVIEW (OUTPATIENT)
Age: 20
End: 2025-04-15

## 2025-04-15 VITALS — OXYGEN SATURATION: 100 % | TEMPERATURE: 98 F | RESPIRATION RATE: 24 BRPM | HEART RATE: 92 BPM

## 2025-04-15 DIAGNOSIS — Z92.89 PERSONAL HISTORY OF OTHER MEDICAL TREATMENT: Chronic | ICD-10-CM

## 2025-04-15 DIAGNOSIS — Z98.1 ARTHRODESIS STATUS: Chronic | ICD-10-CM

## 2025-04-15 DIAGNOSIS — H72.01 CENTRAL PERFORATION OF TYMPANIC MEMBRANE, RIGHT EAR: ICD-10-CM

## 2025-04-15 DIAGNOSIS — H04.553 ACQUIRED STENOSIS OF BILATERAL NASOLACRIMAL DUCT: Chronic | ICD-10-CM

## 2025-04-15 PROBLEM — H72.90 UNSPECIFIED PERFORATION OF TYMPANIC MEMBRANE, UNSPECIFIED EAR: Chronic | Status: ACTIVE | Noted: 2025-04-09

## 2025-04-15 PROBLEM — G80.9 CEREBRAL PALSY, UNSPECIFIED: Chronic | Status: ACTIVE | Noted: 2025-04-09

## 2025-04-15 PROCEDURE — 92502 EAR AND THROAT EXAMINATION: CPT | Mod: 59

## 2025-04-15 PROCEDURE — 69631 REPAIR EARDRUM STRUCTURES: CPT | Mod: RT

## 2025-04-15 PROCEDURE — 88305 TISSUE EXAM BY PATHOLOGIST: CPT | Mod: 26

## 2025-04-15 PROCEDURE — 92516 FACIAL NERVE FUNCTION TEST: CPT

## 2025-04-15 PROCEDURE — 15770 DERMA-FAT-FASCIA GRAFT: CPT

## 2025-04-15 DEVICE — SURGIFOAM 8 X 12.25CM X 2MM: Type: IMPLANTABLE DEVICE | Site: BILATERAL | Status: FUNCTIONAL

## 2025-04-15 RX ORDER — MIDAZOLAM IN 0.9 % SOD.CHLORID 1 MG/ML
18 PLASTIC BAG, INJECTION (ML) INTRAVENOUS ONCE
Refills: 0 | Status: DISCONTINUED | OUTPATIENT
Start: 2025-04-15 | End: 2025-04-29

## 2025-04-15 RX ORDER — OXYCODONE HYDROCHLORIDE 30 MG/1
1 TABLET ORAL
Qty: 10 | Refills: 0
Start: 2025-04-15 | End: 2025-04-17

## 2025-04-15 RX ORDER — OXYCODONE HYDROCHLORIDE 30 MG/1
5 TABLET ORAL EVERY 6 HOURS
Refills: 0 | Status: DISCONTINUED | OUTPATIENT
Start: 2025-04-15 | End: 2025-04-15

## 2025-04-15 RX ORDER — CEFDINIR 250 MG/5ML
10 POWDER, FOR SUSPENSION ORAL
Qty: 1 | Refills: 0
Start: 2025-04-15 | End: 2025-04-24

## 2025-04-15 RX ORDER — CEFDINIR 250 MG/5ML
1 POWDER, FOR SUSPENSION ORAL
Qty: 20 | Refills: 0
Start: 2025-04-15 | End: 2025-04-24

## 2025-04-15 RX ORDER — ACETAMINOPHEN 500 MG/5ML
2 LIQUID (ML) ORAL
Qty: 0 | Refills: 0 | DISCHARGE
Start: 2025-04-15

## 2025-04-15 RX ORDER — IBUPROFEN 200 MG
1 TABLET ORAL
Qty: 0 | Refills: 0 | DISCHARGE

## 2025-04-15 RX ORDER — ACETAMINOPHEN 500 MG/5ML
650 LIQUID (ML) ORAL EVERY 6 HOURS
Refills: 0 | Status: DISCONTINUED | OUTPATIENT
Start: 2025-04-15 | End: 2025-04-29

## 2025-04-15 NOTE — BRIEF OPERATIVE NOTE - NSICDXBRIEFPREOP_GEN_ALL_CORE_FT
[facility] RN Note    Previous documentation since 1/9/24 reviewed.  No concerns reported by [facility] staff or member.      PRE-OP DIAGNOSIS:  Tympanic membrane perforation, right 15-Apr-2025 11:27:43  Nila Olivares

## 2025-04-15 NOTE — ASU DISCHARGE PLAN (ADULT/PEDIATRIC) - PAIN MANAGEMENT
Prescriptions electronically submitted to pharmacy from [facility] Tylenol after 5pm/Prescriptions electronically submitted to pharmacy from [facility]

## 2025-04-15 NOTE — ASU DISCHARGE PLAN (ADULT/PEDIATRIC) - NS MD DC FALL RISK RISK
For information on Fall & Injury Prevention, visit: [email or web address] OR  [email or web address] OR  https://www.cdc.gov/steadi/patient.html

## 2025-04-15 NOTE — ASU DISCHARGE PLAN (ADULT/PEDIATRIC) - ASU DC SPECIAL INSTRUCTIONSFT
See tympanoplasty instructions.    Take antibiotics until there are none remaining. Keep the ear dry.     Tylenol/motrin for mild pain, can use oxycodone for severe or breakthrough.

## 2025-04-15 NOTE — ASU DISCHARGE PLAN (ADULT/PEDIATRIC) - CARE PROVIDER_API CALL
Ramiro Metzger  Otolaryngology  [address] [place] [id number]  Phone: [phone]  Fax: [phone]  Follow Up Time:

## 2025-04-15 NOTE — ASU DISCHARGE PLAN (ADULT/PEDIATRIC) - FINANCIAL ASSISTANCE
[facility] provides services at a reduced cost to those who are determined to be eligible through [facility]’s financial assistance program. Information regarding [facility]’s financial assistance program can be found by going to [email or web address] or by calling [phone].

## 2025-04-18 LAB — SURGICAL PATHOLOGY STUDY: SIGNIFICANT CHANGE UP

## 2025-04-22 ENCOUNTER — NON-APPOINTMENT (OUTPATIENT)
Age: 20
End: 2025-04-22

## 2025-04-23 ENCOUNTER — APPOINTMENT (OUTPATIENT)
Dept: OTOLARYNGOLOGY | Facility: CLINIC | Age: 20
End: 2025-04-23

## 2025-04-23 ENCOUNTER — NON-APPOINTMENT (OUTPATIENT)
Age: 20
End: 2025-04-23

## 2025-04-23 PROCEDURE — 99024 POSTOP FOLLOW-UP VISIT: CPT

## 2025-04-29 RX ORDER — OFLOXACIN OTIC 3 MG/ML
0.3 SOLUTION AURICULAR (OTIC)
Qty: 2 | Refills: 0 | Status: ACTIVE | COMMUNITY
Start: 2025-04-23 | End: 1900-01-01

## 2025-04-30 ENCOUNTER — NON-APPOINTMENT (OUTPATIENT)
Age: 20
End: 2025-04-30

## 2025-05-12 ENCOUNTER — EMERGENCY (EMERGENCY)
Age: 20
LOS: 1 days | End: 2025-05-12
Attending: EMERGENCY MEDICINE | Admitting: EMERGENCY MEDICINE
Payer: MEDICAID

## 2025-05-12 VITALS — OXYGEN SATURATION: 95 % | RESPIRATION RATE: 25 BRPM | HEART RATE: 130 BPM | WEIGHT: 84 LBS | TEMPERATURE: 99 F

## 2025-05-12 DIAGNOSIS — H04.553 ACQUIRED STENOSIS OF BILATERAL NASOLACRIMAL DUCT: Chronic | ICD-10-CM

## 2025-05-12 DIAGNOSIS — Z98.1 ARTHRODESIS STATUS: Chronic | ICD-10-CM

## 2025-05-12 DIAGNOSIS — Z92.89 PERSONAL HISTORY OF OTHER MEDICAL TREATMENT: Chronic | ICD-10-CM

## 2025-05-12 PROCEDURE — 73590 X-RAY EXAM OF LOWER LEG: CPT | Mod: 26,LT

## 2025-05-12 PROCEDURE — 99285 EMERGENCY DEPT VISIT HI MDM: CPT

## 2025-05-12 PROCEDURE — 73620 X-RAY EXAM OF FOOT: CPT | Mod: 26,LT

## 2025-05-12 PROCEDURE — 73610 X-RAY EXAM OF ANKLE: CPT | Mod: 26,LT

## 2025-05-12 RX ORDER — MIDAZOLAM IN 0.9 % SOD.CHLORID 1 MG/ML
10 PLASTIC BAG, INJECTION (ML) INTRAVENOUS ONCE
Refills: 0 | Status: DISCONTINUED | OUTPATIENT
Start: 2025-05-12 | End: 2025-05-12

## 2025-05-12 RX ORDER — IBUPROFEN 200 MG
300 TABLET ORAL ONCE
Refills: 0 | Status: COMPLETED | OUTPATIENT
Start: 2025-05-12 | End: 2025-05-12

## 2025-05-12 RX ADMIN — Medication 10 MILLIGRAM(S): at 23:49

## 2025-05-12 RX ADMIN — Medication 300 MILLIGRAM(S): at 18:37

## 2025-05-12 NOTE — ED PROVIDER NOTE - OBJECTIVE STATEMENT
This is a 18 yo with CP, GDD, nonverbal, neuromuscular scoliosis s/p T2-L4 completion of instrumentation, PSF vertebral osteotomies 8/2020, This is a 20 yo with CP, GDD, nonverbal, neuromuscular scoliosis s/p T2-L4 completion of instrumentation, PSF vertebral osteotomies 8/2020, chronic otitis media with b/l TM perforation and sensorineural hearing loss, s/p surgery April 15 2025. Came home from school with what parents think is L foot pain. Patient at baseline is able to walk a short distance independently, but today was wobbling, grimacing. Denies fevers, cough, congestion, vomiting, diarrhea. Denies any known trauma to area.     Meds: miralax and ear drops  VUTD  NKDA

## 2025-05-12 NOTE — ED PROVIDER NOTE - WET READ LAUNCH FT
No care due was identified.  [facility] Embedded Care Due Messages. Reference number: [id number].   4/02/2025 4:12:20 PM CDT   There is 1 Wet Read(s) to document. There are no Wet Read(s) to document.

## 2025-05-12 NOTE — CONSULT NOTE PEDS - ASSESSMENT
ASSESSMENT & PLAN  19yFemale w/ R anterior distal tibia fx s/p immobilization.    -NWB RLE in a LLC  -cast precautions  -pain control  -ice/cold compress, elevation  -no acute ortho surgery at this time  -f/u outpatient w Dr. Priest in 1 wk, call office for appt      Radha Nava, PGY-2  Orthopedic Surgery    [facility]: [id number]  [facility]: [id number]  [facility]:  [id number]/ [phone]   ASSESSMENT & PLAN  19yFemale w/ L anterior distal tibia fx s/p immobilization.    -NWB LLE in a LLC  -cast precautions  -pain control  -ice/cold compress, elevation  -no acute ortho surgery at this time  -f/u outpatient w Dr. Brooks in 1 wk, call office for appt      Radha Nava, PGY-2  Orthopedic Surgery    [facility]: [id number]  [facility]: [id number]  [facility]:  [id number]/ [phone]   ASSESSMENT & PLAN  19yFemale w/ initial concern for L anterior distal tibia fx but with CT imaging showing no fx or dislocation    -LLE LLC applied and then removed after CT showing no fx  -pt was ambulating after LLC was removed  -pain control  -ice/cold compress, elevation  -no acute ortho surgery at this time  -f/u outpatient w Dr. Brooks in 1 wk, call office for appt      Radha Nava, PGY-2  Orthopedic Surgery    [facility]: [id number]  [facility]: [id number]  [facility]:  [id number]/ [phone]   ASSESSMENT & PLAN  19yFemale w/ initial concern for L anterior distal tibia fx but with CT imaging showing no fx or dislocation    -LLE LLC applied and then removed after CT showing no fx  -pt was ambulating after LLC was removed  -pain control  -WBAT LLE  -ice/cold compress, elevation  -no acute ortho surgery at this time  -f/u outpatient w Dr. Brooks in 1 wk, call office for appt      Radha Nava, PGY-2  Orthopedic Surgery    [facility]: [id number]  [facility]: [id number]  [facility]:  [id number]/ [phone]

## 2025-05-12 NOTE — ED PROVIDER NOTE - PATIENT PORTAL LINK FT
You can access the FollowMyHealth Patient Portal offered by [facility] by registering at the following website: [email or web address]. By joining [facility]’s FollowMyHealth portal, you will also be able to view your health information using other applications (apps) compatible with our system.

## 2025-05-12 NOTE — ED PROVIDER NOTE - RAPID ASSESSMENT
20 y/o female hx of nonverbal , global developmental delay, cerebral palsy, neuromuscular scolioses,  s/p T2-L4 completion of instrumentation, PSF vertebral osteotomies 8/2020,  chronic otitis media BIB parents c/o came from school today and unable to weight bear on her lt leg, Mother ? foot pain.  As per parents 20 y/o female hx of nonverbal , global developmental delay, cerebral palsy, neuromuscular scolioses,  s/p T2-L4 completion of instrumentation, PSF vertebral osteotomies 8/2020,  chronic otitis media BIB parents c/o came from school today and unable to weight bear on her lt leg, Mother ? foot pain. Mother called to school and  no reported injury or falls, As per mother denies fever, URI s/s, V/D. pt gestures pain when touching her lt lower leg and foot. Pt is nonverbal and tachycardic  without fever informed charge  nurse Isidro Roman and moved to room 14 report given Dr ANYA Salazar

## 2025-05-12 NOTE — ED PROVIDER NOTE - HIV OFFER
Purulent drainage from both eyes noted the morning following admission. Improved with erythromycin ointment.   Unable to answer due to medical condition/unresponsive/etc...

## 2025-05-12 NOTE — CONSULT NOTE PEDS - SUBJECTIVE AND OBJECTIVE BOX
HPI  19yFemale c/o R ankle pain since she came home from school today. Mom notes that she is not sure how the injury happened, but pt usually walks when she is in her house and today she was only sitting in her wheelchair and crying when her ankle was touched. Denies headstrike or LOC. Denies numbness/tingling in the RLE. Denies any other trauma/injuries at this time. At baseline, community ambulator w/o assistive devices.    ROS  Negative unless otherwise specified in HPI.    PAST MEDICAL & SURGICAL Hx  PAST MEDICAL & SURGICAL HISTORY:  Global Developmental Delay      Patient is Zoroastrian      Neuromuscular scoliosis      Cerebral palsy      Perforated tympanic membrane      History of MRI  of brain at 3-5yrs of age. no complications.  6/3/20, [facility] [place]      Obstruction of both lacrimal ducts  1yrs of age, [facility]      History of spinal fusion for scoliosis  stage 1, 7/23/20. Dr. Brooks.      S/P spinal fusion          MEDICATIONS  Home Medications:  acetaminophen 325 mg oral tablet: 2 tab(s) orally every 6 hours As needed Temp greater or equal to 38C (100.4F), Mild Pain (1 - 3) (15 Apr 2025 11:36)  ibuprofen 600 mg oral tablet: 1 tab(s) orally every 6 hours as needed for  moderate pain (15 Apr 2025 11:36)  MiraLax oral powder for reconstitution: 17 gram(s) orally once a day (09 Apr 2025 12:30)      ALLERGIES  No Known Allergies      FAMILY Hx  FAMILY HISTORY:      SOCIAL Hx  Social History:      VITALS  Vital Signs Last 24 Hrs  T(C): 36.7 (12 May 2025 22:49), Max: 37.1 (12 May 2025 16:56)  T(F): 98 (12 May 2025 22:49), Max: 98.7 (12 May 2025 16:56)  HR: 120 (12 May 2025 22:49) (91 - 130)  BP: 108/90 (12 May 2025 22:49) (108/90 - 129/89)  BP(mean): --  RR: 24 (12 May 2025 22:49) (24 - 28)  SpO2: 100% (12 May 2025 22:49) (95% - 100%)    Parameters below as of 12 May 2025 22:49  Patient On (Oxygen Delivery Method): room air        PHYSICAL EXAM  Gen: Lying in bed, NAD, nonverbal  Resp: No increased WOB  RLE:  Skin intact, no edema or ecchymosis over R knee  +TTP over ankle, no TTP along remainder of extremity; compartments soft  FROM at ankle  Motor: spontaneously PF and DF ankle and wiggling all toes  DP/SP/Tib/Andria/Saph grossly intact  +DP pulse (symmetric relative to contralateral side), WWP    Secondary survey:  No TTP along spine or other extremities, pelvis grossly stable, sensation grossly intact and compartments soft throughout    LABS              IMAGING  < from: Xray Tibia + Fibula 2 Views, Left (05.12.25 @ 20:50) >  FINDINGS:    Obliquely oriented fracture of the anterior distal tibia best seen on   lateral view.  No dislocation.  No significant soft tissue swelling.    IMPRESSION:  Obliquely oriented fracture of the anterior distal tibia.    < end of copied text >      PROCEDURE  A well-padded, well-molded fiberglass cast was applied. The patient tolerated the procedure well without evidence of complications and was neurovascularly intact afterward. The patient was informed about cast precautions (keep dry, do not stick anything inside, monitor for signs/symptoms of increased compartmental pressure: uncontrolled pain, worsening numbness/tingling, severe pain with movement of the fingers/toes) and verbalized understanding.   HPI  19yFemale c/o L ankle pain since she came home from school today. Mom notes that she is not sure how the injury happened, but pt usually walks when she is in her house and today she was only sitting in her wheelchair and crying when her ankle was touched. Denies headstrike or LOC. Denies numbness/tingling in the LLE. Denies any other trauma/injuries at this time. At baseline, community ambulator w/o assistive devices.    ROS  Negative unless otherwise specified in HPI.    PAST MEDICAL & SURGICAL Hx  PAST MEDICAL & SURGICAL HISTORY:  Global Developmental Delay      Patient is Hoahaoism      Neuromuscular scoliosis      Cerebral palsy      Perforated tympanic membrane      History of MRI  of brain at 3-5yrs of age. no complications.  6[place]20, [facility] [place]      Obstruction of both lacrimal ducts  1yrs of age, [facility]      History of spinal fusion for scoliosis  stage 1, 7/23/20. Dr. Brooks.      S/P spinal fusion          MEDICATIONS  Home Medications:  acetaminophen 325 mg oral tablet: 2 tab(s) orally every 6 hours As needed Temp greater or equal to 38C (100.4F), Mild Pain (1 - 3) (15 Apr 2025 11:36)  ibuprofen 600 mg oral tablet: 1 tab(s) orally every 6 hours as needed for  moderate pain (15 Apr 2025 11:36)  MiraLax oral powder for reconstitution: 17 gram(s) orally once a day (09 Apr 2025 12:30)      ALLERGIES  No Known Allergies      FAMILY Hx  FAMILY HISTORY:      SOCIAL Hx  Social History:      VITALS  Vital Signs Last 24 Hrs  T(C): 36.7 (12 May 2025 22:49), Max: 37.1 (12 May 2025 16:56)  T(F): 98 (12 May 2025 22:49), Max: 98.7 (12 May 2025 16:56)  HR: 120 (12 May 2025 22:49) (91 - 130)  BP: 108/90 (12 May 2025 22:49) (108/90 - 129/89)  BP(mean): --  RR: 24 (12 May 2025 22:49) (24 - 28)  SpO2: 100% (12 May 2025 22:49) (95% - 100%)    Parameters below as of 12 May 2025 22:49  Patient On (Oxygen Delivery Method): room air        PHYSICAL EXAM  Gen: Lying in bed, NAD, nonverbal  Resp: No increased WOB  LLE:  Skin intact, no edema or ecchymosi  +TTP over ankle, no TTP along remainder of extremity; compartments soft  FROM at ankle  Motor: spontaneously PF and DF ankle and wiggling all toes  DP/SP/Tib/Andria/Saph grossly intact  +DP pulse (symmetric relative to contralateral side), WWP    Secondary survey:  No TTP along spine or other extremities, pelvis grossly stable, sensation grossly intact and compartments soft throughout    LABS              IMAGING  < from: Xray Tibia + Fibula 2 Views, Left (05.12.25 @ 20:50) >  FINDINGS:    Obliquely oriented fracture of the anterior distal tibia best seen on   lateral view.  No dislocation.  No significant soft tissue swelling.    IMPRESSION:  Obliquely oriented fracture of the anterior distal tibia.    < end of copied text >      PROCEDURE  A well-padded, well-molded fiberglass cast was applied. The patient tolerated the procedure well without evidence of complications and was neurovascularly intact afterward. The patient was informed about cast precautions (keep dry, do not stick anything inside, monitor for signs/symptoms of increased compartmental pressure: uncontrolled pain, worsening numbness/tingling, severe pain with movement of the fingers/toes) and verbalized understanding.   HPI  19yFemale c/o L ankle pain since she came home from school today. Mom notes that she is not sure how the injury happened, but pt usually walks when she is in her house and today she was only sitting in her wheelchair and crying when her ankle was touched. Denies headstrike or LOC. Denies numbness/tingling in the LLE. Denies any other trauma/injuries at this time. At baseline, community ambulator w/o assistive devices.    ROS  Negative unless otherwise specified in HPI.    PAST MEDICAL & SURGICAL Hx  PAST MEDICAL & SURGICAL HISTORY:  Global Developmental Delay      Patient is Cheondoism      Neuromuscular scoliosis      Cerebral palsy      Perforated tympanic membrane      History of MRI  of brain at 3-5yrs of age. no complications.  [place] [facility] [place] of both lacrimal ducts  1yrs of age, [facility]      History of spinal fusion for scoliosis  stage 1, 7/23/20. Dr. Brooks.      S/P spinal fusion          MEDICATIONS  Home Medications:  acetaminophen 325 mg oral tablet: 2 tab(s) orally every 6 hours As needed Temp greater or equal to 38C (100.4F), Mild Pain (1 - 3) (15 Apr 2025 11:36)  ibuprofen 600 mg oral tablet: 1 tab(s) orally every 6 hours as needed for  moderate pain (15 Apr 2025 11:36)  MiraLax oral powder for reconstitution: 17 gram(s) orally once a day (09 Apr 2025 12:30)      ALLERGIES  No Known Allergies      FAMILY Hx  FAMILY HISTORY:      SOCIAL Hx  Social History:      VITALS  Vital Signs Last 24 Hrs  T(C): 36.7 (12 May 2025 22:49), Max: 37.1 (12 May 2025 16:56)  T(F): 98 (12 May 2025 22:49), Max: 98.7 (12 May 2025 16:56)  HR: 120 (12 May 2025 22:49) (91 - 130)  BP: 108/90 (12 May 2025 22:49) (108/90 - 129/89)  BP(mean): --  RR: 24 (12 May 2025 22:49) (24 - 28)  SpO2: 100% (12 May 2025 22:49) (95% - 100%)    Parameters below as of 12 May 2025 22:49  Patient On (Oxygen Delivery Method): room air        PHYSICAL EXAM  Gen: Lying in bed, NAD, nonverbal  Resp: No increased WOB  LLE:  Skin intact, no edema or ecchymosis  +TTP diffusely over leg/ankle, no point tenderness; compartments soft  FROM at ankle  Motor: spontaneously PF and DF ankle and wiggling all toes  DP/SP/Tib/Andria/Saph grossly intact  +DP pulse (symmetric relative to contralateral side), WWP    Secondary survey:  No TTP along spine or other extremities, pelvis grossly stable, sensation grossly intact and compartments soft throughout    LABS              IMAGING  < from: Xray Tibia + Fibula 2 Views, Left (05.12.25 @ 20:50) >  FINDINGS:    Obliquely oriented fracture of the anterior distal tibia best seen on   lateral view.  No dislocation.  No significant soft tissue swelling.    IMPRESSION:  Obliquely oriented fracture of the anterior distal tibia.    < end of copied text >    CT L ankle showing no obvious fx or dislocation      PROCEDURE  A well-padded, well-molded fiberglass cast was applied. The patient tolerated the procedure well without evidence of complications and was neurovascularly intact afterward.     After CT was performed showing no obvious fx or dislocation, LLC was removed.    HPI  19yFemale c/o L ankle pain since she came home from school today. Mom notes that she is not sure how the injury happened, but pt usually walks when she is in her house and today she was only sitting in her wheelchair and wincing in pain when her L leg and ankle were touched. Denies headstrike or LOC. Denies numbness/tingling in the LLE. Denies any other trauma/injuries at this time. At baseline, community ambulator w/o assistive devices.    ROS  Negative unless otherwise specified in HPI.    PAST MEDICAL & SURGICAL Hx  PAST MEDICAL & SURGICAL HISTORY:  Global Developmental Delay      Patient is Scientologist      Neuromuscular scoliosis      Cerebral palsy      Perforated tympanic membrane      History of MRI  of brain at 3-5yrs of age. no complications.  [place] [facility] [place]      Obstruction of both lacrimal ducts  1yrs of age, [facility]      History of spinal fusion for scoliosis  stage 1, 7/23/20. Dr. Brooks.      S/P spinal fusion          MEDICATIONS  Home Medications:  acetaminophen 325 mg oral tablet: 2 tab(s) orally every 6 hours As needed Temp greater or equal to 38C (100.4F), Mild Pain (1 - 3) (15 Apr 2025 11:36)  ibuprofen 600 mg oral tablet: 1 tab(s) orally every 6 hours as needed for  moderate pain (15 Apr 2025 11:36)  MiraLax oral powder for reconstitution: 17 gram(s) orally once a day (09 Apr 2025 12:30)      ALLERGIES  No Known Allergies      FAMILY Hx  FAMILY HISTORY:      SOCIAL Hx  Social History:      VITALS  Vital Signs Last 24 Hrs  T(C): 36.7 (12 May 2025 22:49), Max: 37.1 (12 May 2025 16:56)  T(F): 98 (12 May 2025 22:49), Max: 98.7 (12 May 2025 16:56)  HR: 120 (12 May 2025 22:49) (91 - 130)  BP: 108/90 (12 May 2025 22:49) (108/90 - 129/89)  BP(mean): --  RR: 24 (12 May 2025 22:49) (24 - 28)  SpO2: 100% (12 May 2025 22:49) (95% - 100%)    Parameters below as of 12 May 2025 22:49  Patient On (Oxygen Delivery Method): room air        PHYSICAL EXAM  Gen: Lying in bed, NAD, nonverbal  Resp: No increased WOB  LLE:  Skin intact, no edema or ecchymosis  +TTP diffusely over leg/ankle, no point tenderness; compartments soft  FROM at ankle  Motor: spontaneously PF and DF ankle and wiggling all toes  DP/SP/Tib/Andria/Saph grossly intact  +DP pulse (symmetric relative to contralateral side), WWP    Secondary survey:  No TTP along spine or other extremities, pelvis grossly stable, sensation grossly intact and compartments soft throughout    LABS              IMAGING  < from: Xray Tibia + Fibula 2 Views, Left (05.12.25 @ 20:50) >  FINDINGS:    Obliquely oriented fracture of the anterior distal tibia best seen on   lateral view.  No dislocation.  No significant soft tissue swelling.    IMPRESSION:  Obliquely oriented fracture of the anterior distal tibia.    < end of copied text >    CT L ankle showing no obvious fx or dislocation  < from: CT Ankle No Cont, Left (05.13.25 @ 03:14) >  IMPRESSION:    No obvious displaced fracture or dislocation. Somewhat corticated,   cortical irregularity of the left medial distal tibia without significant   soft tissue edema, suggesting developmental/chronic. If there is   continued clinical suspicion for acute injury, follow-up MR may be   obtained for further evaluation.    < end of copied text >        PROCEDURE  A well-padded, well-molded fiberglass LLC was applied. The patient tolerated the procedure well without evidence of complications and was neurovascularly intact afterward.     After CT was performed showing no obvious fx or dislocation, LLC was removed.

## 2025-05-12 NOTE — ED PROVIDER NOTE - ATTENDING CONTRIBUTION TO CARE
The resident's documentation has been prepared under my direction and personally reviewed by me in its entirety. I confirm that the note above accurately reflects all work, treatment, procedures, and medical decision making performed by me. Please see MIREYA Salazar MD PEM Attending

## 2025-05-12 NOTE — ED PROVIDER NOTE - NSFOLLOWUPINSTRUCTIONS_ED_ALL_ED_FT
Fractures in Children    Your child was seen today in the Emergency Department and diagnosed with a fracture.   Your child was put in cast or splint to help it rest and heal.      General tips for taking care of a child who has a splint or cast in place:  -You will likely have some pain for the next 1-2 days; use ibuprofen every 6 hours as needed to help with pain control.    Follow-up with the Pediatric Orthopedist as instructed, call for an appointment at [phone].  Before then, if you notice swelling, numbness, color change, or worsening pain, return to the ED.     Casts and splints are supports that are worn to protect broken bones and other injuries. A cast or splint may hold a bone still and in the correct position while it heals. Casts and splints may also help ease pain, swelling, and muscle spasms. A cast that is a hardened is usually made of fiberglass or plaster. It is custom-fit to the body and it offers more protection than a splint. It cannot be taken off and put back on. A splint is a type of soft support that is usually made from cloth and elastic. It can be adjusted or taken off as needed.    GENERAL INSTRUCTIONS:  -Do not allow your child to put pressure on any part of the cast or splint until it is fully hardened. This may take several hours.  -Ask your child's health care provider what activities are safe for your child.  -Give over-the-counter and prescription medicines only as told by your child's health care provider.  -Keep all follow-up visits.  This is important for the health of your child’s bones.  -Contact the orthopedist if: the splint/cast gets wet or damaged; skin under or around the cast becomes red or raw; under the cast is extremely itchy or painful; the cast or splint feels very uncomfortable; the cast or splint is too tight or too loose; an object gets stuck under the cast.  -Your child will need to limit activity while the injury is healing.  -Use a hair dryer on COLD settings to blow into the cast if there is itchiness; DO NOT stick things under the cast/splint to scratch an itch!    HOW TO CARE FOR A CAST?  -Do not allow your child to stick anything inside the cast to scratch the skin. Sticking something in the cast increases your child's risk of skin infection.  -Check the skin around the cast every day. Tell your child's health care provider about any concerns.  -You may put lotion on dry skin around the edges of the cast. Do not put lotion on the skin underneath the cast.  -Keep the cast clean.  -Do not let it get wet! Cover it with a watertight covering when your child takes a bath or a shower.    HOW TO CARE FOR A SPLINT?  -Have your child wear it as told by your child's health care provider. Remove it only as told by your child's health care provider.  -Loosen the splint if your child's fingers or toes tingle, become numb, or turn cold and blue.  -Keep the splint clean.  -Do not let it get wet! Cover it with a watertight covering when your child takes a bath or a shower.    Follow up with your pediatrician in 1-2 days to make sure that your child is doing better.    Return to the Emergency Department if:  -Your child's pain is getting worse.  -Your child’s injured area tingles, becomes numb, or turns cold and blue.  -Your child cannot feel or move his or her fingers or toes.  -There is fluid leaking through the cast.  -Your child has severe pain or pressure under the cast. Fractures in Children    Your child was seen today in the Emergency Department and diagnosed with a fracture.   Your child was put in cast or splint to help it rest and heal.  Please follow up with pediatrician and orthopedics outpatient. Please monitor L pain and if persists, please reach out to pediatrician. If fevers develop, please return to ED.     General tips for taking care of a child who has a splint or cast in place:  -You will likely have some pain for the next 1-2 days; use ibuprofen every 6 hours as needed to help with pain control.    Follow-up with the Pediatric Orthopedist as instructed, call for an appointment at [phone].  Before then, if you notice swelling, numbness, color change, or worsening pain, return to the ED.     Casts and splints are supports that are worn to protect broken bones and other injuries. A cast or splint may hold a bone still and in the correct position while it heals. Casts and splints may also help ease pain, swelling, and muscle spasms. A cast that is a hardened is usually made of fiberglass or plaster. It is custom-fit to the body and it offers more protection than a splint. It cannot be taken off and put back on. A splint is a type of soft support that is usually made from cloth and elastic. It can be adjusted or taken off as needed.    GENERAL INSTRUCTIONS:  -Do not allow your child to put pressure on any part of the cast or splint until it is fully hardened. This may take several hours.  -Ask your child's health care provider what activities are safe for your child.  -Give over-the-counter and prescription medicines only as told by your child's health care provider.  -Keep all follow-up visits.  This is important for the health of your child’s bones.  -Contact the orthopedist if: the splint/cast gets wet or damaged; skin under or around the cast becomes red or raw; under the cast is extremely itchy or painful; the cast or splint feels very uncomfortable; the cast or splint is too tight or too loose; an object gets stuck under the cast.  -Your child will need to limit activity while the injury is healing.  -Use a hair dryer on COLD settings to blow into the cast if there is itchiness; DO NOT stick things under the cast/splint to scratch an itch!    HOW TO CARE FOR A CAST?  -Do not allow your child to stick anything inside the cast to scratch the skin. Sticking something in the cast increases your child's risk of skin infection.  -Check the skin around the cast every day. Tell your child's health care provider about any concerns.  -You may put lotion on dry skin around the edges of the cast. Do not put lotion on the skin underneath the cast.  -Keep the cast clean.  -Do not let it get wet! Cover it with a watertight covering when your child takes a bath or a shower.    HOW TO CARE FOR A SPLINT?  -Have your child wear it as told by your child's health care provider. Remove it only as told by your child's health care provider.  -Loosen the splint if your child's fingers or toes tingle, become numb, or turn cold and blue.  -Keep the splint clean.  -Do not let it get wet! Cover it with a watertight covering when your child takes a bath or a shower.    Follow up with your pediatrician in 1-2 days to make sure that your child is doing better.    Return to the Emergency Department if:  -Your child's pain is getting worse.  -Your child’s injured area tingles, becomes numb, or turns cold and blue.  -Your child cannot feel or move his or her fingers or toes.  -There is fluid leaking through the cast.  -Your child has severe pain or pressure under the cast. Your child was seen in the ED for left leg pain and difficulty ambulating. Your child was found not to have a fracture. If fever develops with persistent difficulty ambulating, please return to ED. Please follow up with your pediatrician tomorrow. Please follow up with your orthopedist this week.     WHAT YOU NEED TO KNOW:    Leg pain may be caused by a variety of health conditions. Your tests did not show any broken bones or blood clots.    DISCHARGE INSTRUCTIONS:    Return to the emergency department if:    You have a fever.    Your leg starts to swell.    Your leg pain gets worse.    You have numbness or tingling in your leg or toes.    You cannot put any weight on or move your leg.  Contact your healthcare provider if:    Your pain does not decrease, even after treatment.    You have questions or concerns about your condition or care.

## 2025-05-12 NOTE — ED PROVIDER NOTE - CARE PROVIDER_API CALL
Irineo Brooks  Orthopaedic Surgery  [address] [place] [id number]  Phone: [phone]  Fax: [phone]  Follow Up Time: 7-10 Days

## 2025-05-12 NOTE — ED PEDIATRIC NURSE NOTE - CHIEF COMPLAINT QUOTE
c/o left foot pain starting after school. No meds given today. No swelling or deformity noted of foot. Hx of CP, GDD. NKDA. IUTD. UTO BP due to movement. Capillary refill <2 seconds. Stated weight used in triage

## 2025-05-12 NOTE — ED PROVIDER NOTE - NSICDXPASTMEDICALHX_GEN_ALL_CORE_FT
PAST MEDICAL HISTORY:  Cerebral palsy     Global Developmental Delay     Neuromuscular scoliosis     Patient is Hinduism     Perforated tympanic membrane

## 2025-05-12 NOTE — ED PROVIDER NOTE - CLINICAL SUMMARY MEDICAL DECISION MAKING FREE TEXT BOX
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 Difficult to examine given patient is unable to comprehend directions to do a complete neuro exam, and is resisting flexion and extension of knees and ankle but continues to grimace which is consistent with likely pain. Per family, patient is usually very active, so patient's current mobility is not baseline. ordered xray foot, ankle, tibia+fibula. 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 Difficult to examine given patient is unable to comprehend directions to do a complete neuro exam, and is resisting flexion and extension of knees and ankle but continues to grimace which is consistent with likely pain. Per family, patient is usually very active, so patient's current mobility is not baseline. ordered xray foot, ankle, tibia+fibula.    Attendin20 y/o F with hx of CP, GDD, nonverbal, neuromuscular scoliosis s/p T2-L4 instrumentation, vertebral ostomy, chronic otitis media s/p surgery for this, sensorineural healing loss presenting with concern of left leg and ankle pain. Has some hypotonia at baseline but is able to walk several steps independently. After coming home today is not wanting to bear weight on L leg. Parents note discomfort there. No known trauma at school however mother not sure. No swelling, bruising, deformity. No fevers, vomiting, diarrhea. No URI symptoms. On exam here eyes clear, oropharynx MMM, lungs CTAB, RRR, no murmur, abd soft, sitting in wheelchair, ?tenderness in L ankle when palpating, no klarissa discomfort, no swelling, bruising, 2+ pedal pulses, when stool would not put down L foot. Foot xray done, will add xray ankle and tib/fib.  Will give Motrin. Reassess. ANYA Salazar MD PEM Attending

## 2025-05-12 NOTE — ED PROVIDER NOTE - NSICDXPASTSURGICALHX_GEN_ALL_CORE_FT
PAST SURGICAL HISTORY:  History of MRI of brain at 3-5yrs of age. no complications.  6/3/20, [facility]    History of spinal fusion for scoliosis stage 1, 7/23/20. Dr. Brooks.    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    S/P spinal fusion

## 2025-05-12 NOTE — ED PROVIDER NOTE - PHYSICAL EXAMINATION
General: No acute distress, non toxic appearing  Neuro: Alert, Awake, no acute change from baseline, patellar reflexes intact b/l, patient resisting when attempting to move extend knees and ankles   HEENT: NC/AT PERRL, EOMI  Neck: Supple  CV: RRR, Normal S1/S2, no m/r/g  Resp: Chest clear to auscultation b/L; no w/r/r  Abd: Soft, NT/ND  Ext: FROM General: No acute distress, non toxic appearing  Neuro: Alert, Awake, no acute change from baseline, patellar reflexes intact b/l, patient resisting when attempting to move extend knees and ankles, when attempted to stand not bearing weight on left ankle   HEENT: NC/AT PERRL, EOMI  Neck: Supple  CV: RRR, Normal S1/S2, no m/r/g  Resp: Chest clear to auscultation b/L; no w/r/r  Abd: Soft, NT/ND  Ext: FROM

## 2025-05-13 VITALS — OXYGEN SATURATION: 97 % | RESPIRATION RATE: 20 BRPM | HEART RATE: 93 BPM

## 2025-05-13 PROCEDURE — 73700 CT LOWER EXTREMITY W/O DYE: CPT | Mod: 26,LT

## 2025-05-13 PROCEDURE — 73590 X-RAY EXAM OF LOWER LEG: CPT | Mod: 26,LT

## 2025-05-13 PROCEDURE — 73610 X-RAY EXAM OF ANKLE: CPT | Mod: 26,LT

## 2025-05-13 PROCEDURE — 73552 X-RAY EXAM OF FEMUR 2/>: CPT | Mod: 26,LT

## 2025-05-13 NOTE — ED PEDIATRIC NURSE REASSESSMENT NOTE - STATUS
ortho/awaiting consult
ortho at bedside for casting/awaiting consult
ortho/awaiting consult
podiatry/awaiting consult
ortho/awaiting consult

## 2025-05-13 NOTE — ED PEDIATRIC NURSE REASSESSMENT NOTE - NS ED NURSE REASSESS COMMENT FT2
MD at bedside for d/c. Will continue to monitor.
Pt at xray at this time.
ortho at bedside to remove cast.
Pt at baseline mental status, resting comfortably in bed. VS as charted, no indications of pain present at this time. Waiting for CT results. Mom at bedside, plan of care discussed. Will continue to monitor.
Pt at baseline mental status. VS as charted, no indications of pain present. Waiting for ortho to remove cast at this time. Mom at bedside, plan of care discussed. Will continue to monitor.
Pt at baseline mental status. Meds given per EMR and ortho recs. Ortho at bedside at this time for casting. Pt on pulse ox. Parents at bedside, plan of care discussed. Will continue to monitor.
Pt at baseline mental status. VS as charted, no indications of pain present at this time. Ortho casted leg. Waiting for CT at this time. Pt on pulse ox. Parents at bedside, plan of care discussed. Will continue to monitor.
Pt at baseline mental status. VS as charted, no indications of pain present at this time. Pt in bed with safety rails up at this time. Waiting for ortho for casting. Mom and sister at bedside, plan of care discussed. Will continue to monitor.
Pt awake, alert, and interactive. anxious and crying, MD made aware of abnormal vitals, TP RN made aware, UTO BP, attempted twice, pt perfusing well, BCR<2 seconds. VS as per flowsheet. No S+S of respiratory distress, brisk cap refill. Safety maintained. Family at bedside. Plan of care ongoing.

## 2025-05-13 NOTE — ED PEDIATRIC NURSE REASSESSMENT NOTE - GENERAL PATIENT STATE
comfortable appearance
comfortable appearance
comfortable appearance/resting/sleeping
comfortable appearance
comfortable appearance
comfortable appearance/resting/sleeping

## 2025-05-14 ENCOUNTER — APPOINTMENT (OUTPATIENT)
Dept: OTOLARYNGOLOGY | Facility: CLINIC | Age: 20
End: 2025-05-14
Payer: MEDICAID

## 2025-05-14 DIAGNOSIS — H72.02 CENTRAL PERFORATION OF TYMPANIC MEMBRANE, LEFT EAR: ICD-10-CM

## 2025-05-14 DIAGNOSIS — H90.3 SENSORINEURAL HEARING LOSS, BILATERAL: ICD-10-CM

## 2025-05-14 DIAGNOSIS — H72.03 CENTRAL PERFORATION OF TYMPANIC MEMBRANE, BILATERAL: ICD-10-CM

## 2025-05-14 PROCEDURE — 99024 POSTOP FOLLOW-UP VISIT: CPT

## 2025-05-15 ENCOUNTER — TRANSCRIPTION ENCOUNTER (OUTPATIENT)
Age: 20
End: 2025-05-15

## 2025-06-02 ENCOUNTER — APPOINTMENT (OUTPATIENT)
Dept: OTOLARYNGOLOGY | Facility: CLINIC | Age: 20
End: 2025-06-02

## 2025-06-02 VITALS — BODY MASS INDEX: 15.74 KG/M2 | WEIGHT: 75 LBS | HEIGHT: 58 IN

## 2025-06-02 PROCEDURE — 99024 POSTOP FOLLOW-UP VISIT: CPT

## 2025-06-10 ENCOUNTER — APPOINTMENT (OUTPATIENT)
Age: 20
End: 2025-06-10
Payer: COMMERCIAL

## 2025-06-10 PROCEDURE — D0120: CPT

## 2025-06-10 PROCEDURE — D1110 PROPHYLAXIS - ADULT: CPT

## 2025-06-23 ENCOUNTER — APPOINTMENT (OUTPATIENT)
Dept: PHARMACY | Facility: CLINIC | Age: 20
End: 2025-06-23

## (undated) DEVICE — SUT CHROMIC 3-0 27" RB-1

## (undated) DEVICE — WARMING BLANKET UNDERBODY PEDS 36 X 33"

## (undated) DEVICE — DRSG TEGADERM 6 X 8"

## (undated) DEVICE — SUT PLAIN GUT FAST ABSORBING 5-0 PC-1

## (undated) DEVICE — STAPLER SKIN PROXIMATE

## (undated) DEVICE — ELCTR GROUNDING PAD ADULT COVIDIEN

## (undated) DEVICE — BIPOLAR FORCEP KIRWAN JEWELERS STR 4" X 0.4MM W 12FT CORD (GREEN)

## (undated) DEVICE — MARKING PEN W RULER

## (undated) DEVICE — FOLEY CATH 2-WAY 20FR 5CC LATEX COUNCIL RED

## (undated) DEVICE — WARMING BLANKET LOWER ADULT

## (undated) DEVICE — ELCTR GROUNDING PAD INFANT COVIDIEN

## (undated) DEVICE — VENODYNE/SCD SLEEVE CALF MEDIUM

## (undated) DEVICE — POSITIONER PATIENT SAFETY STRAP 3X60"

## (undated) DEVICE — LABELS BLANK W PEN

## (undated) DEVICE — ELCTR BOVIE TIP BLADE INSULATED 4" EDGE

## (undated) DEVICE — DRSG STERISTRIPS 0.5 X 4"

## (undated) DEVICE — DRAPE MICROSCOPE OPMI VISIONGUARD 48X118"

## (undated) DEVICE — DRSG PELLET

## (undated) DEVICE — ELCTR MONOPOLAR STIMULATOR PROBE FLUSH-TIP

## (undated) DEVICE — DRSG MASTISOL

## (undated) DEVICE — SOL IRR POUR NS 0.9% 500ML

## (undated) DEVICE — POSITIONER FOAM HEAD DONUT 9" (PINK)

## (undated) DEVICE — DRAPE SURGICAL #1010

## (undated) DEVICE — BULB SYRINGE 60CC

## (undated) DEVICE — POSITIONER FOAM EGG CRATE ULNAR 2PCS (PINK)

## (undated) DEVICE — SOL IRR POUR H2O 500ML

## (undated) DEVICE — SYR LUER LOK 20CC

## (undated) DEVICE — CONN FEMALE LUER ADAPTOR SM XMAS TREE

## (undated) DEVICE — SCOPE COLIBRI MICRO ENT DISP

## (undated) DEVICE — CLIP IRRIGATIION FOR QD8/QD5S ANGLE ATTACHMENT

## (undated) DEVICE — POSITIONER PINK PAD PIGAZZI SYSTEM W ARM PROTECTOR

## (undated) DEVICE — NEPTUNE 4-PORT MANIFOLD W/ SPECIMEN COLLECTION

## (undated) DEVICE — ELCTR ROCKER SWITCH PENCIL BLUE 10FT

## (undated) DEVICE — DRSG TELFA 3 X 8

## (undated) DEVICE — BAG DECANTER IV STERILE

## (undated) DEVICE — SYR LUER LOK 5CC

## (undated) DEVICE — GLV 7 PROTEXIS (WHITE)

## (undated) DEVICE — CLIPPER BLADE GENERAL USE

## (undated) DEVICE — DRAPE INSTRUMENT POUCH 6.75" X 11"

## (undated) DEVICE — SYR LUER LOK 1CC

## (undated) DEVICE — DRAPE TOWEL BLUE 17" X 24"

## (undated) DEVICE — DRILL BIT ANSPACH DIAMOND BALL 2MMX5CM

## (undated) DEVICE — COTTONBALL LG

## (undated) DEVICE — DRAPE 3/4 SHEET 52X76"

## (undated) DEVICE — DRAIN PENROSE .5" X 18" LATEX

## (undated) DEVICE — PACK MASTOID/MIDDLE EAR

## (undated) DEVICE — TUBING IRRIGATION HF NAVIO

## (undated) DEVICE — DRAPE SPLIT SHEET 77" X 120"

## (undated) DEVICE — PREP BETADINE KIT

## (undated) DEVICE — ELCTR NDL SUBDERMAL 2 CHANNEL

## (undated) DEVICE — SUT MONOCRYL 4-0 18" P-3 UNDYED